# Patient Record
Sex: FEMALE | Race: WHITE | URBAN - METROPOLITAN AREA
[De-identification: names, ages, dates, MRNs, and addresses within clinical notes are randomized per-mention and may not be internally consistent; named-entity substitution may affect disease eponyms.]

---

## 2017-08-06 ENCOUNTER — HISTORICAL (OUTPATIENT)
Dept: ADMINISTRATIVE | Facility: HOSPITAL | Age: 22
End: 2017-08-06

## 2021-08-24 ENCOUNTER — HOSPITAL ENCOUNTER (OUTPATIENT)
Dept: MEDSURG UNIT | Facility: HOSPITAL | Age: 26
End: 2021-08-26
Attending: HOSPITALIST | Admitting: HOSPITALIST

## 2021-08-24 LAB
ABS NEUT (OLG): 2.88 X10(3)/MCL (ref 2.1–9.2)
ALBUMIN SERPL-MCNC: 4.1 GM/DL (ref 3.5–5)
ALBUMIN/GLOB SERPL: 1.6 RATIO (ref 1.1–2)
ALP SERPL-CCNC: 51 UNIT/L (ref 40–150)
ALT SERPL-CCNC: 25 UNIT/L (ref 0–55)
APPEARANCE, UA: CLEAR
APTT PPP: 36.1 SECOND(S) (ref 23.2–33.7)
AST SERPL-CCNC: 31 UNIT/L (ref 5–34)
B-HCG SERPL QL: NEGATIVE
BACTERIA SPEC CULT: ABNORMAL /HPF
BASOPHILS # BLD AUTO: 0 X10(3)/MCL (ref 0–0.2)
BASOPHILS NFR BLD AUTO: 0 %
BILIRUB SERPL-MCNC: 0.3 MG/DL
BILIRUB UR QL STRIP: ABNORMAL
BILIRUBIN DIRECT+TOT PNL SERPL-MCNC: 0.1 MG/DL (ref 0–0.8)
BILIRUBIN DIRECT+TOT PNL SERPL-MCNC: 0.2 MG/DL (ref 0–0.5)
BUN SERPL-MCNC: 10.5 MG/DL (ref 7–18.7)
CALCIUM SERPL-MCNC: 8.5 MG/DL (ref 8.4–10.2)
CHLORIDE SERPL-SCNC: 105 MMOL/L (ref 98–107)
CO2 SERPL-SCNC: 23 MMOL/L (ref 22–29)
COLOR STL: ABNORMAL
COLOR UR: YELLOW
CONSISTENCY STL: ABNORMAL
CREAT SERPL-MCNC: 0.73 MG/DL (ref 0.55–1.02)
CRP SERPL-MCNC: 1.32 MG/DL
D DIMER PPP IA.FEU-MCNC: 0.6 MCG/ML FEU (ref 0–0.5)
EOSINOPHIL # BLD AUTO: 0 X10(3)/MCL (ref 0–0.9)
EOSINOPHIL NFR BLD AUTO: 0 %
ERYTHROCYTE [DISTWIDTH] IN BLOOD BY AUTOMATED COUNT: 12 % (ref 11.5–17)
ERYTHROCYTE [DISTWIDTH] IN BLOOD BY AUTOMATED COUNT: 12 % (ref 11.5–17)
ERYTHROCYTE [SEDIMENTATION RATE] IN BLOOD: 21 MM/HR (ref 0–20)
FERRITIN SERPL-MCNC: 250.98 NG/ML (ref 4.63–204)
FOLATE SERPL-MCNC: 13.3 NG/ML (ref 7–31.4)
GLOBULIN SER-MCNC: 2.6 GM/DL (ref 2.4–3.5)
GLUCOSE (UA): ABNORMAL
GLUCOSE SERPL-MCNC: 114 MG/DL (ref 74–100)
GROUP & RH: NORMAL
HCT VFR BLD AUTO: 36 % (ref 37–47)
HCT VFR BLD AUTO: 37.2 % (ref 37–47)
HCT VFR BLD AUTO: 40.3 % (ref 37–47)
HCT VFR BLD AUTO: 40.9 % (ref 37–47)
HEMOCCULT SP1 STL QL: POSITIVE
HGB BLD-MCNC: 11.9 GM/DL (ref 12–16)
HGB BLD-MCNC: 12.1 GM/DL (ref 12–16)
HGB BLD-MCNC: 13.3 GM/DL (ref 12–16)
HGB BLD-MCNC: 14 GM/DL (ref 12–16)
HGB UR QL STRIP: ABNORMAL
INR PPP: 1 (ref 0–1.3)
IRON SATN MFR SERPL: 9 % (ref 20–50)
IRON SERPL-MCNC: 16 UG/DL (ref 50–170)
KETONES UR QL STRIP: ABNORMAL
LDH SERPL-CCNC: 332 UNIT/L (ref 140–271)
LEUKOCYTE ESTERASE UR QL STRIP: NEGATIVE
LYMPHOCYTES # BLD AUTO: 1.2 X10(3)/MCL (ref 0.6–4.6)
LYMPHOCYTES NFR BLD AUTO: 28 %
MCH RBC QN AUTO: 30.4 PG (ref 27–31)
MCH RBC QN AUTO: 30.6 PG (ref 27–31)
MCHC RBC AUTO-ENTMCNC: 33.1 GM/DL (ref 33–36)
MCHC RBC AUTO-ENTMCNC: 34.2 GM/DL (ref 33–36)
MCV RBC AUTO: 89.5 FL (ref 80–94)
MCV RBC AUTO: 91.8 FL (ref 80–94)
MONOCYTES # BLD AUTO: 0.2 X10(3)/MCL (ref 0.1–1.3)
MONOCYTES NFR BLD AUTO: 5 %
NEUTROPHILS # BLD AUTO: 2.88 X10(3)/MCL (ref 2.1–9.2)
NEUTROPHILS NFR BLD AUTO: 66 %
NITRITE UR QL STRIP: NEGATIVE
PH UR STRIP: 6.5 [PH] (ref 5–9)
PLATELET # BLD AUTO: 146 X10(3)/MCL (ref 130–400)
PLATELET # BLD AUTO: 153 X10(3)/MCL (ref 130–400)
PMV BLD AUTO: 10.2 FL (ref 9.4–12.4)
PMV BLD AUTO: 9.7 FL (ref 9.4–12.4)
POTASSIUM SERPL-SCNC: 4 MMOL/L (ref 3.5–5.1)
PROT SERPL-MCNC: 6.7 GM/DL (ref 6.4–8.3)
PROT UR QL STRIP: ABNORMAL
PROTHROMBIN TIME: 12.6 SECOND(S) (ref 12.5–14.5)
RBC # BLD AUTO: 3.92 X10(6)/MCL (ref 4.2–5.4)
RBC # BLD AUTO: 4.57 X10(6)/MCL (ref 4.2–5.4)
RBC #/AREA URNS HPF: ABNORMAL /HPF
SARS-COV-2 AG RESP QL IA.RAPID: POSITIVE
SODIUM SERPL-SCNC: 139 MMOL/L (ref 136–145)
SP GR UR STRIP: 1.02 (ref 1–1.03)
SQUAMOUS EPITHELIAL, UA: ABNORMAL /HPF
TIBC SERPL-MCNC: 164 UG/DL (ref 70–310)
TIBC SERPL-MCNC: 180 UG/DL (ref 250–450)
TRANSFERRIN SERPL-MCNC: 165 MG/DL (ref 180–382)
UA WBC MAN: ABNORMAL /HPF
UROBILINOGEN UR STRIP-ACNC: 1
VIT B12 SERPL-MCNC: 966 PG/ML (ref 213–816)
WBC # SPEC AUTO: 4 X10(3)/MCL (ref 4.5–11.5)
WBC # SPEC AUTO: 4.4 X10(3)/MCL (ref 4.5–11.5)

## 2021-08-25 LAB
ABS NEUT (OLG): 1.67 X10(3)/MCL (ref 2.1–9.2)
ALBUMIN SERPL-MCNC: 3.2 GM/DL (ref 3.5–5)
ALBUMIN/GLOB SERPL: 1.5 RATIO (ref 1.1–2)
ALP SERPL-CCNC: 43 UNIT/L (ref 40–150)
ALT SERPL-CCNC: 20 UNIT/L (ref 0–55)
AST SERPL-CCNC: 21 UNIT/L (ref 5–34)
BILIRUB SERPL-MCNC: 0.3 MG/DL
BILIRUBIN DIRECT+TOT PNL SERPL-MCNC: 0.1 MG/DL (ref 0–0.5)
BILIRUBIN DIRECT+TOT PNL SERPL-MCNC: 0.2 MG/DL (ref 0–0.8)
BUN SERPL-MCNC: 5.8 MG/DL (ref 7–18.7)
CALCIUM SERPL-MCNC: 7.9 MG/DL (ref 8.4–10.2)
CHLORIDE SERPL-SCNC: 109 MMOL/L (ref 98–107)
CO2 SERPL-SCNC: 25 MMOL/L (ref 22–29)
CREAT SERPL-MCNC: 0.61 MG/DL (ref 0.55–1.02)
EOSINOPHIL # BLD AUTO: 0 X10(3)/MCL (ref 0–0.9)
EOSINOPHIL NFR BLD AUTO: 0 %
ERYTHROCYTE [DISTWIDTH] IN BLOOD BY AUTOMATED COUNT: 12.1 % (ref 11.5–17)
GLOBULIN SER-MCNC: 2.2 GM/DL (ref 2.4–3.5)
GLUCOSE SERPL-MCNC: 92 MG/DL (ref 74–100)
HCT VFR BLD AUTO: 35.4 % (ref 37–47)
HCT VFR BLD AUTO: 36.4 % (ref 37–47)
HCT VFR BLD AUTO: 37.5 % (ref 37–47)
HCT VFR BLD AUTO: 40.7 % (ref 37–47)
HGB BLD-MCNC: 11.6 GM/DL (ref 12–16)
HGB BLD-MCNC: 11.9 GM/DL (ref 12–16)
HGB BLD-MCNC: 12 GM/DL (ref 12–16)
HGB BLD-MCNC: 13 GM/DL (ref 12–16)
LYMPHOCYTES # BLD AUTO: 1.3 X10(3)/MCL (ref 0.6–4.6)
LYMPHOCYTES NFR BLD AUTO: 40 %
MCH RBC QN AUTO: 30.2 PG (ref 27–31)
MCHC RBC AUTO-ENTMCNC: 32 GM/DL (ref 33–36)
MCV RBC AUTO: 94.5 FL (ref 80–94)
MONOCYTES # BLD AUTO: 0.2 X10(3)/MCL (ref 0.1–1.3)
MONOCYTES NFR BLD AUTO: 7 %
NEUTROPHILS # BLD AUTO: 1.67 X10(3)/MCL (ref 2.1–9.2)
NEUTROPHILS NFR BLD AUTO: 52 %
PLATELET # BLD AUTO: 156 X10(3)/MCL (ref 130–400)
PMV BLD AUTO: 9.5 FL (ref 9.4–12.4)
POTASSIUM SERPL-SCNC: 3.7 MMOL/L (ref 3.5–5.1)
PROT SERPL-MCNC: 5.4 GM/DL (ref 6.4–8.3)
RBC # BLD AUTO: 3.97 X10(6)/MCL (ref 4.2–5.4)
SODIUM SERPL-SCNC: 141 MMOL/L (ref 136–145)
WBC # SPEC AUTO: 3.2 X10(3)/MCL (ref 4.5–11.5)

## 2021-08-26 LAB
FINAL CULTURE: NO GROWTH
HCT VFR BLD AUTO: 37.1 % (ref 37–47)
HGB BLD-MCNC: 12 GM/DL (ref 12–16)

## 2022-04-29 NOTE — ED PROVIDER NOTES
Patient:   Kiara Turner            MRN: [id number]            FIN: [id number]               Age:   26 years     Sex:  Female     :  1995   Associated Diagnoses:   Near syncope; Peptic ulcer symptoms; Acute GI bleeding; COVID-19   Author:   Clay TORRES, Feliciano CHATTERJEE      Basic Information   Time seen: Date & time 2021 09:55:00.   History source: Patient.   Arrival mode: Private vehicle.   History limitation: None.      History of Present Illness   The patient presents with rectal bleeding, dark stools, 25 y/o female who presents with blood in stool this AM and felt like she was going to pass out. hx peptic ulcer, stopped taking the medication because one of the medications was making her drowsy. also covid19+. dhaval medina and   I, Dr. Williamson, assumed care of this patient at 1205.    25 y/o female presents to the ED complaining of a near-syncopal episode after dry-heaving onset this morning PTA. She reports bright red and somewhat dark blood in her stool over the last 2 days. She also reports some rectal pain with BMs, but denies any rectal pain today. She has a history of peptic ulcers, but denies any history of an EGD. States taking omeprazole for a while but is not taking it now. Pt currently has COVID-19. She reports ocasional SOB and a nonproductive cough, but denies any CP. She is not taking any medications for this..  The onset was just prior to arrival.  The course/duration of symptoms is episodic: with a few episodes and fluctuating in intensity.  Vomiting: none.  Rectal bleed: blood with stool and degree moderate.  The exacerbating factor is none.  The relieving factor is none.  Risk factors consist of none.  Prior episodes: occasional.  Therapy today: none.  Associated symptoms: rectal pain, cough and shortness of breath.        Review of Systems   Constitutional symptoms:  No fever, no chills   Skin symptoms:  No rash, no abrasions   Respiratory symptoms:  Shortness of breath  (occasional), cough (nonproductive)   Cardiovascular symptoms:  No chest pain, no palpitations, no syncope   Gastrointestinal symptoms:  Nausea, vomiting, Blood in stool, No abdominal pain,    Genitourinary symptoms:  No hematuria,    Musculoskeletal symptoms:  No Muscle pain   Neurologic symptoms:  Near-syncope, no headache, no dizziness, no altered level of consciousness.              Additional review of systems information: All other systems reviewed and otherwise negative.      Health Status   Allergies:    Allergic Reactions (Selected)  No Known Allergies,    Allergies (1) Active Reaction  No Known Allergies None Documented  .   Medications:  (Selected)   Prescriptions  Prescribed  omeprazole 40 mg oral DR capsule: 40 mg = 1 cap(s), Oral, Daily, take in the morning 30 min before you eat or drink anything, # 30 cap(s), 2 Refill(s), Pharmacy: [facility][id number], 175.3, cm, Height/Length Dosing, 08/16/21 18:01:00 CDT, 85.1, kg, Weight Dosing, 08/16/21 18:0...  Documented Medications  Documented  Protonix 40 mg ORAL enteric coated tablet: 40 mg = 1 tab(s), Oral, Once, 0 Refill(s)  Zofran 2 mg/mL injectable solution: 4 mg = 2 mL, IV Push, Once, PRN PRN nausea, 0 Refill(s).      Past Medical/ Family/ Social History   Medical history:    No active or resolved past medical history items have been selected or recorded..   Surgical history:    Tonsillectomy planned (SNOMED CT [id number]) in 1997 at 2 Years..   Family history:    Hypertension.  Mother  .   Social history: Alcohol use: Denies, Tobacco use: Denies, Drug use: Denies.      Physical Examination               Vital Signs   Vital Signs   8/24/2021 12:00 CDT      Peripheral Pulse Rate     86 bpm                             Respiratory Rate          21 br/min                             SpO2                      92 %  LOW                             Oxygen Therapy            Room air                             Systolic Blood  Pressure   109 mmHg                             Diastolic Blood Pressure  72 mmHg                             Mean Arterial Pressure, Cuff              84 mmHg    8/24/2021 11:00 CDT      Peripheral Pulse Rate     85 bpm                             Heart Rate Monitored      88 bpm                             Respiratory Rate          20 br/min                             SpO2                      98 %                             Systolic Blood Pressure   98 mmHg                             Diastolic Blood Pressure  60 mmHg                             Mean Arterial Pressure, Cuff              73 mmHg    8/24/2021 10:29 CDT      Peripheral Pulse Rate     93 bpm                             Heart Rate Monitored      94 bpm                             Respiratory Rate          16 br/min                             SpO2                      97 %                             Oxygen Therapy            Room air                             Systolic Blood Pressure   111 mmHg                             Diastolic Blood Pressure  63 mmHg                             Mean Arterial Pressure, Cuff              79 mmHg    8/24/2021 9:54 CDT       Temperature Oral          36.9 DegC                             Temperature Oral (calculated)             98.42 DegF                             Peripheral Pulse Rate     97 bpm                             Respiratory Rate          18 br/min                             SpO2                      96 %                             Oxygen Therapy            Room air                             Systolic Blood Pressure   98 mmHg                             Diastolic Blood Pressure  70 mmHg  .   Measurements   8/24/2021 9:54 CDT       Weight Dosing             81.5 kg                             Weight Measured and Calculated in Lbs     179.67 lb                             Weight Estimated          81.5 kg                             Height/Length Dosing      175.26 cm                              Height/Length Estimated   175.26 cm                             Body Mass Index Estimated 26.53 kg/m2  .   Basic Oxygen Information   2021 12:00 CDT      SpO2                      92 %  LOW                             Oxygen Therapy            Room air    2021 11:00 CDT      SpO2                      98 %    2021 10:29 CDT      SpO2                      97 %                             Oxygen Therapy            Room air    2021 9:54 CDT       SpO2                      96 %                             Oxygen Therapy            Room air  .   General:  Alert, no acute distress   Neurological:  Alert and oriented to person, place, time, and situation, No focal neurological deficit observed, normal speech observed   Skin:  Warm, dry   Head:  Normocephalic, atraumatic   Eye:     Cardiovascular:  Regular rate and rhythm, Normal peripheral perfusion, No edema   Respiratory:  Lungs are clear to auscultation, respirations are non-labored, breath sounds are equal, Symmetrical chest wall expansion.    Musculoskeletal:  No deformity   Gastrointestinal:  Soft, Nontender, Non distended, Normal bowel sounds, Guarding: Negative, Rebound: Negative, Rectal exam: Hemorrhoid appreciated at 12 o'clock position. No blood or stool in rectal vault. .    Psychiatric:  Cooperative, appropriate mood & affect      Medical Decision Making   Rationale:  Occasional mild shortness of breath with her COVID-19 infection CT angiogram ordered to rule out PE was negative.  No active bleeding on rectal exam.  2 hour H&H is dropped raising concern of bleeding ulcer.  She is already gotten 80 Protonix.  Discussed case with GI patient will be admitted plan for scope in the near future.   Documents reviewed:  Emergency department nurses' notes.   Orders  Launch Order Profile (Selected)   Inpatient Orders  Ordered  HT Screenin21 9:52:11 CDT  Normal Saline Infusion 1,000 mL: 1,000 mL, 1,000 mL, IV, 1,000 mL/hr, start date 21  10:00:00 CDT, 1.97, m2  Patient Isolation: 08/24/21 9:58:57 CDT, Contact Precautions, Constant Indicator  Patient Isolation: 08/24/21 9:58:57 CDT, Droplet Precautions, Constant Indicator  Ordered (Collected)  Urine Culture [id number]: Stat collect, 08/24/21 10:00:00 CDT, Urine, Collected, Nurse collect, [id number], Stop date 08/24/21 10:00:00 CDT, Print Label By Order Location  Canceled  ABO/Rh Retype: Now collect, 08/24/21 10:22:13 CDT, Blood, Stop date 08/24/21 11:37:00 CDT, Lab Collect  Completed  ABO/Rh Retype: Now collect, 08/24/21 10:22:13 CDT, Blood, Stop date 08/24/21 10:22:00 CDT, Lab Collect, 1, Print Label By Order Location  Automated Diff: NOW collect, 08/24/21 10:22:00 CDT, Blood, Collected, Stop date 08/24/21 10:22:00 CDT, Lab Collect, Print Label By Order Location, 08/24/21 9:59:00 CDT  CBC - NO DIFF: STAT collect, 08/24/21 12:20:43 CDT, BLOOD, Collected, Stop date 08/24/21 12:20:00 CDT, Lab Collect  CBC w/ Auto Diff: NOW collect, 08/24/21 10:22:13 CDT, BLOOD, Collected, Stop date 08/24/21 10:22:00 CDT, Lab Collect  CMP: STAT collect, 08/24/21 10:22:13 CDT, BLOOD, Collected, Stop date 08/24/21 10:22:00 CDT, Lab Collect  CT Angio Chest PE W Contrast: Stat, 08/24/21 12:12:00 CDT, Pulmonary Embolism, None, Stretcher, Creatinine if needed per protocol, Rad Type, Schedule this test, 08/24/21 12:12:00 CDT  Estimated Glomerular Filtration Rate: STAT collect, 08/24/21 10:22:00 CDT, Blood, Collected, Stop date 08/24/21 10:22:00 CDT, Lab Collect, Print Label By Order Location, 08/24/21 9:59:00 CDT  PT: STAT collect, 08/24/21 10:22:13 CDT, BLOOD, Collected, Stop date 08/24/21 10:22:00 CDT, Lab Collect  PTT: STAT collect, 08/24/21 10:22:13 CDT, BLOOD, Collected, Stop date 08/24/21 10:22:00 CDT, Lab Collect  Pregnancy Test Urine: Stat collect, Urine, 08/24/21 10:00:00 CDT, Stop date 08/24/21 10:00:00 CDT, Nurse collect, Print Label By Order Location, 08/24/21 10:00:00 CDT  Protonix 40 mg Vial (IV Push): 40 mg,  form: Injection, IV Slow, Once, Infuse over: 2 minute(s), first dose 08/24/21 12:13:00 CDT, stop date 08/24/21 12:13:00 CDT, STAT  Protonix: 80 mg, form: Injection, IV Slow, Once-NOW, first dose 08/24/21 9:59:00 CDT, stop date 08/24/21 9:59:00 CDT, STAT  Type and Ab Screen: 08/24/21 9:59:00 CDT, Stat collect, Blood, Lab Collect, Packed RBC, To Keep Ahead, 1, 08/24/21, Print Label By Order Location, 08/24/21 9:59:00 CDT  Urinalysis with Reflex: Stat collect, Urine, 08/24/21 10:00:00 CDT, Stop date 08/24/21 10:00:00 CDT, Nurse collect, Print Label By Order Location  iopamidol: form: Soln, IV, AdHoc, first dose 08/24/21 12:49:33 CDT, stop date 08/24/21 12:49:33 CDT.   Results review:  Lab results : Lab View   8/24/2021 12:20 CDT      WBC                       4.0 x10(3)/mcL  LOW                             RBC                       3.92 x10(6)/mcL  LOW                             Hgb                       11.9 gm/dL  LOW                             Hct                       36.0 %  LOW                             Platelet                  146 x10(3)/mcL                             MCV                       91.8 fL                             MCH                       30.4 pg                             MCHC                      33.1 gm/dL                             RDW                       12.0 %                             MPV                       10.2 fL    8/24/2021 11:06 CDT      Est Creat Clearance Ser   119.91 mL/min    8/24/2021 10:22 CDT      Sodium Lvl                139 mmol/L                             Potassium Lvl             4.0 mmol/L                             Chloride                  105 mmol/L                             CO2                       23 mmol/L                             Calcium Lvl               8.5 mg/dL                             Glucose Lvl               114 mg/dL  HI                             BUN                       10.5 mg/dL                             Creatinine                 0.73 mg/dL                             eGFR-AA                   >60  NA                             eGFR-NURYS                  >60 mL/min/1.73 m2  NA                             Bili Total                0.3 mg/dL                             Bili Direct               0.2 mg/dL                             Bili Indirect             0.10 mg/dL                             AST                       31 unit/L                             ALT                       25 unit/L                             Alk Phos                  51 unit/L                             Total Protein             6.7 gm/dL                             Albumin Lvl               4.1 gm/dL                             Globulin                  2.6 gm/dL                             A/G Ratio                 1.6 ratio                             PT                        12.6 second(s)                             INR                       1.0                             PTT                       36.1 second(s)  HI                             WBC                       4.4 x10(3)/mcL  LOW                             RBC                       4.57 x10(6)/mcL                             Hgb                       14.0 gm/dL                             Hct                       40.9 %                             Platelet                  153 x10(3)/mcL                             MCV                       89.5 fL                             MCH                       30.6 pg                             MCHC                      34.2 gm/dL                             RDW                       12.0 %                             MPV                       9.7 fL                             Abs Neut                  2.88 x10(3)/mcL                             Neutro Auto               66 %  NA                             Lymph Auto                28 %  NA                             Mono Auto                 5 %  NA                             Eos Auto                   0 %  NA                             Abs Eos                   0.0 x10(3)/mcL                             Basophil Auto             0 %  NA                             Abs Neutro                2.88 x10(3)/mcL                             Abs Lymph                 1.2 x10(3)/mcL                             Abs Mono                  0.2 x10(3)/mcL                             Abs Baso                  0.0 x10(3)/mcL                             ABO/Rh                    O POS                             Antibody Screen           Neg    8/24/2021 10:00 CDT      U Beta hCG Ql             Negative                             UA Appear                 Clear                             UA Color                  Yellow                             UA Spec Grav              1.025                             UA Bili                   2+                             UA pH                     6.5                             UA Urobilinogen           1.0                             UA Blood                  3+                             UA Glucose                Trace                             UA Ketones                Trace                             UA Protein                2+                             UA Nitrite                Negative                             UA Leuk Est               Negative                             UA WBC Man                1-2 /HPF                             UA RBC                    5-10 /HPF                             UA Bacteria               Few /HPF                             UA Squam Epithelial       10-20 /HPF  .   Radiology results:  Reviewed radiologist's report, Rad Results (ST)  < 12 hrs   Accession: [id number]  Order: CT Angio Chest PE W Contrast  Report Dt/Tm: 08/24/2021 12:57  Report:   EXAMINATION  CT Angio Chest PE W Contrast     TECHNIQUE       Helical-acquisition CT images were obtained following the  intravenous administration of iodine-based contrast media.  Scanning  was timed to pulmonary arterial system for purposes of pulmonary CTA.       Multiplanar reconstructions, including MIP images, were  accomplished by a CT technologist at a separate workstation and pushed  to PACS for physician review.     Total DLP (mGy-cm): 323        INDICATION  Pulmonary Embolism     Comparison: None     FINDINGS     Lines and tubes: None.     Lower neck: Unremarkable.     Heart and Mediastinum: The heart is normal in size. There is no  pericardial effusion. The main pulmonary trunk is normal in caliber.  There is no pulmonary embolus identified.     Pleura: No pleural effusion or pneumothorax.     Lymph Nodes: No lymphadenopathy.     Lungs: There are scattered bilateral groundglass airspace opacities,  with more consolidative opacities in the lung bases, left greater than  right.     Trachea: Patent.     Upper abdomen: No acute findings     Bones and soft tissues: No acute findings.     IMPRESSION  1.  No pulmonary embolism identified.  2.  Bilateral groundglass and consolidative airspace opacities  concerning for infection, including COVID 19.    .       Procedure   Critical care note   Total time: 30 minutes spent engaged in work directly related to patient care and/ or available for direct patient care.   Critical condition(s) addressed for impending deterioration include: cardiovascular.   Associated risk factors: bleeding.   Performed by: self.      Impression and Plan   Diagnosis   Near syncope (TNJ89-JI R55)   COVID-19 (LLO86-VN U07.1)   Peptic ulcer symptoms (XON77-MA R19.8)   Acute GI bleeding (CWQ89-FV K92.2)      Calls-Consults   -  8/24/2021 13:55:00 , Chris TORRES , Joseph SOUTH, Gastroenterology, consult, recommends admit to IM; will plan for EGD in the morning.    -  8/24/2021 14:11:00 , Hospital Medicine, consult.    Plan   Condition: Guarded.    Disposition: Admit.    Counseled: Patient, Regarding diagnosis, Regarding diagnostic results, Regarding treatment plan, Patient  indicated understanding of instructions.    Notes: I, Dalia Rubi, acted solely as a scribe for and in the presence of Dr. Williamson who performed the service., I, Dr Williamson have read note from scribe and I agree with history and physical except as amended by me.  All information was dictated from my history and my examination of patient..

## 2022-04-29 NOTE — DISCHARGE SUMMARY
Patient:   Kiara Turner            MRN: [id number]            FIN: [id number]               Age:   26 years     Sex:  Female     :  1995   Associated Diagnoses:   None   Author:   Lobo Ayoub MD      Discharge Information      Discharge Summary Information   Admit/Discharge Dates   Admit Date: 2021  Discharge Date: 2021     Physicians   Attending Physician - Breana TORRES, Gatito FARAH  Admitting Physician - Breana TORRES, Gatito FARAH  Consulting Physician - Lobo Ayoub MD     Discharge Diagnosis   Anemia, mild, prob from menstrual blood loss.  Mild acute blood loss from menstrual bleeding   COVID-19 infection with no hypoxia or requirement of oxygen supplementation  History of peptic ulcer disease  History of GERD     Procedures   No procedures recorded for this visit.   Immunizations   1995 - haemophilus b conjugate (HbOC) vaccine   1993 - poliovirus vaccine, live, trivalent   1993 - haemophilus b conjugate (HbOC) vaccine   1993 - poliovirus vaccine, live, trivalent   1994 - haemophilus b conjugate (HbOC) vaccine   1994 - measles/mumps/rubella virus vaccine   1994 - poliovirus vaccine, live, trivalent   1995 - haemophilus b conjugate (HbOC) vaccine   1997 - measles/mumps/rubella virus vaccine   1997 - poliovirus vaccine, live, trivalent   2006 - meningococcal conjugate vaccine   2006 - tetanus/diphtheria/pertussis, acel(Tdap)   11/15/2008 - human papillomavirus vaccine   11/15/2008 - influenza virus vaccine, inactivated   2011 - human papillomavirus vaccine   2011 - influenza virus vaccine, live   06/15/2011 - human papillomavirus vaccine      Discharge Medications   Prescribed  pantoprazole (Pantoprazole 40 mg ORAL EC-Tablet) 40 mg, Oral, BID  sucralfate (Carafate 1 g oral tablet) 1 gm, Oral, AC & HS  Discontinue  omeprazole (omeprazole 40 mg oral DR capsule) 40 mg, Oral, Daily  ondansetron (Zofran  2 mg/mL injectable solution) 4 mg, IV Push, Once, PRN nausea  pantoprazole (Protonix 40 mg ORAL enteric coated tablet) 40 mg, Oral, Once        Education   Gastrointestinal Bleeding, Easy-to-Read  Near-Syncope, Easy-to-Read  Understanding Coronavirus Disease 2019 (COVID-19) Patient Education-updated 7_20_21 (Custom)  Discharge - 08/26/21 8:16:00 CDT, Home, Give all scheduled vaccinations prior to discharge.   Discharge Activity - Activity as Tolerated   Discharge Diet - Regular         Followup   Joseph Perez MD   Office will call w/follow up appointment        Hospital Course   Hospital Course   Kiara Turner is a 26-year-old woman with a history of PUD and GERD who presents to the ED with complaints of one episode of a mixed black and bright red stool with associated mild epigastric pain that occurred this morning which concerned her so she came to the hospital for further evaluation. She was previously diagnosed with peptic ulcer disease and started on PPI, but has not had an EGD or colonoscopy in the past. She does not take any NSAIDs or blood thinners at this time. She has been tolerating p.o. intake, but has been nauseous making it more difficult. She just started her menstrual period this morning and is unsure if the blood in her stool was from that. She does not follow-up with a gastroenterologist and denies GI bleeds in the past. She also been having a dry cough and intermittent fever over the past x2 weeks, but denies chest pain, shortness of breath, vomiting, diarrhea, or dysuria. She has not received her COVID-19 vaccinations. She denies recent travel. Her vital signs are currently stable. She is afebrile and satting at 96% on room air. Her labs showed an Hgb 11.9 which has dropped from labs drawn at our prior that showed an Hgb of 14.0. Her urinalysis showed 3+ blood and 5-10 RBCs. CTA chest showed no pulmonary embolism identified. Bilateral ground-glass and consolidative airspace opacities  concerning for infection, including COVID 19. ED physician reported a hemorrhoid at 12 o'clock position on the rectal exam, but no blood or stool was in rectal vault. Her covid test came back positive. Patient denied any SOB, cough, fever or chills. Her H&H was stable. Seen by GI team and recommeneded PO PPI and carafate. No in-patient EGD recommended. Patient was stable and asymptomatic. She was discharged to home in a stable condition. Covid vaccination offered and patient refused.   Explained in detail to patient about the discharge plan, medications and F/U visits. She understands and agree with the treatment plan.   Condition stable  Diet: Regular   Meds per dc med rec  Activities as tolerated   F/U with PCP in 1 to 2 weeks  For further questions contact hospitalist office  DC 31 mts   .        Physical Examination   General:  Alert and oriented, No acute distress, Pt is COVID positive, so did a focused/limited physical exam .    Respiratory:  Respirations are non-labored, Symmetrical chest wall expansion.    Neurologic:  No focal deficits, Cranial Nerves II-XII are grossly intact.       Discharge Plan   Education and Follow-up   Counseled: patient, regarding diagnosis, regarding treatment, regarding medications.

## 2022-04-29 NOTE — CONSULTS
Patient:   Kiara Turner            MRN: [id number]            FIN: [id number]               Age:   26 years     Sex:  Female     :  1995   Associated Diagnoses:   None   Author:   Sue Ludwig PA-C      Basic Information   Source of history:  Self, Medical record.    Present at bedside:  Medical personnel.    History limitation:  None.       Chief Complaint   We have been consulted by Dr. Puentes for anemia/melena.      History of Present Illness    This is a 27 YO female, unknown to our group, with no significant past medical history. Presented to the ED 21 with complaints of abdominal pain, melena, and near syncope. On arrival, vital signs stable. Labs revealed minimal hyperglycemia 114 otherwise normal CMP, INR 1, and normal CBC without evidence of anemia. Repeat CBC revealed a mild normocytic anemia with hemoglobin 11.9g/dL. Tested positive for COVID-19 yesterday at an outpatient facility. Admitted and GI consulted for anemia/melena.    Reports acute on chronic, severe, epigastric abdominal pain accompanied by nausea, vomiting, unintentional weight loss and anorexia. Onset approximately two weeks ago. Pain is intermittent and stabbing in quality. Admits to nocturnal episodes. Severity 10/10 at its worst, 0/10 at its best. Exacerbated postprandially, usually following ingestion of fried or highly seasoned foods. Alleviated partially by improving her diet to eliminate dietary triggers. Admits to somewhat improvements s/p initiation of Omeprazole recently. Endorses 5-10lb unintentional weight loss in the past 2 weeks. Admits to anorexia. Nausea is intermittent and usually accompanies the abdominal pain. Denies vomiting. Admits to dry heaving. Reports intermittent reflux, pyrosis, and dyspepsia improved in the past 2 weeks. Admits to a recent change in bowel habits described as diarrhea for the past two weeks, mixed between semi-solid and liquid. Reports 3-4 BMs/day.     Reports an  "episode of severe abdominal pain this morning, followed by dry heaving, cold/clammy skin, and presyncope. Experienced one episode of black stool accompanied by bright red upon wiping and in the toilet water. Denies further episodes of hematochezia/rectal bleeding/melena. Denies improvements in abdominal pain s/p defecation. Admits that she may have started her period this morning. Denies history of GI bleeding. Reports history of "PUD" diagnosed on physical examination by her PCP. Experienced an episode 3 years ago exacerbated by eating fried chicken and admits this pain feels quite similar. Uses Aleve occasionally for menstruation; denies use recently in the past 3 weeks. Denies use of tobacco, etoh, or illicit substances. Endoscopy naive.     Evaluated by [facility] ED 8/16/21. Given prescriptions for Omeprazole and Metoclopramide. Stopped Metaclopramide secondary to weakness and fatigue. Admits symptoms are somewhat improved since starting Omeprazole. Abdominal x-ray at that time revealed moderate stool burden.    Previous records reviewed. Collateral information obtained from nurse.      Review of Systems   Constitutional:  Weakness, Fatigue, No fever, No chills.    Eye:  No icterus.    Ear/Nose/Mouth/Throat:  No sore throat.    Respiratory:  No shortness of breath, No cough.    Cardiovascular:  No chest pain, No palpitations.    Gastrointestinal:  Nausea, Vomiting, Diarrhea, Melena, Hematochezia, No constipation, No heartburn, No hematemesis.         Abdominal pain: Epigastric area, The pain is severe, Characterized as ( Sharp ).    Genitourinary:  No dysuria, No hematuria.    Musculoskeletal:  No joint pain, No muscle pain.    Integumentary:  No jaundice, No rash.    Neurologic:  No confusion, No headache.    Psychiatric:  Not suicidal, Not delusional, No hallucinations.       Health Status   Allergies:    Allergies (1) Active Reaction  No Known Allergies None Documented     Current medications:  (Selected) "   Inpatient Medications  Ordered  Normal Saline Infusion 1,000 mL: 1,000 mL, 1,000 mL, IV, 1,000 mL/hr, start date 08/24/21 10:00:00 CDT, 1.97, m2  Prescriptions  Prescribed  omeprazole 40 mg oral DR capsule: 40 mg = 1 cap(s), Oral, Daily, take in the morning 30 min before you eat or drink anything, # 30 cap(s), 2 Refill(s), Pharmacy: [id number], 175.3, cm, Height/Length Dosing, 08/16/21 18:01:00 CDT, 85.1, kg, Weight Dosing, 08/16/21 18:0...  Documented Medications  Documented  Protonix 40 mg ORAL enteric coated tablet: 40 mg = 1 tab(s), Oral, Once, 0 Refill(s)  Zofran 2 mg/mL injectable solution: 4 mg = 2 mL, IV Push, Once, PRN PRN nausea, 0 Refill(s),    Medications (1) Active  Scheduled: (0)  Continuous: (1)  sodium chloride 0.9% 1,000 mL  1,000 mL, IV, 1,000 mL/hr  PRN: (0)     Problem list:    No problem items selected or recorded.      Histories   Past Medical History:    No active or resolved past medical history items have been selected or recorded.   Family History:    Hypertension.  Mother     Procedure history:    Tonsillectomy planned ([id number]) in 1997 at 2 Years.   Social History        Social & Psychosocial Habits    Alcohol  10/01/2014 Risk Assessment: Denies Alcohol Use    08/06/2017  Use: Current    Frequency: 1-2 times per year    Substance Use  10/01/2014 Risk Assessment: Denies Substance Abuse    08/16/2021  Use: Never    Tobacco  10/01/2014 Risk Assessment: Denies Tobacco Use    08/06/2017  Use: Never smoker  .        Physical Examination      Vital Signs (last 24 hrs)_____  Last Charted___________  Temp Oral     36.9 DegC  (AUG 24 09:54)  Heart Rate Peripheral   75 bpm  (AUG 24 13:00)  Resp Rate         23 br/min  (AUG 24 13:00)  SBP      91 mmHg  (AUG 24 13:00)  DBP      60 mmHg  (AUG 24 13:00)  SpO2      95 %  (AUG 24 13:00)     Not performed secondary to COVID isolation precautions.       Review / Management   Results review   Laboratory Results    Today's Lab Results : PowerNote Discrete Results   8/24/2021 12:20 CDT      WBC                       4.0 x10(3)/mcL  LOW                             RBC                       3.92 x10(6)/mcL  LOW                             Hgb                       11.9 gm/dL  LOW                             Hct                       36.0 %  LOW                             Platelet                  146 x10(3)/mcL                             MCV                       91.8 fL                             MCH                       30.4 pg                             MCHC                      33.1 gm/dL                             RDW                       12.0 %                             MPV                       10.2 fL    8/24/2021 11:06 CDT      Est Creat Clearance Ser   119.91 mL/min    8/24/2021 10:22 CDT      WBC                       4.4 x10(3)/mcL  LOW                             RBC                       4.57 x10(6)/mcL                             Hgb                       14.0 gm/dL                             Hct                       40.9 %                             Platelet                  153 x10(3)/mcL                             MCV                       89.5 fL                             MCH                       30.6 pg                             MCHC                      34.2 gm/dL                             RDW                       12.0 %                             MPV                       9.7 fL                             Abs Neut                  2.88 x10(3)/mcL                             Neutro Auto               66 %  NA                             Lymph Auto                28 %  NA                             Mono Auto                 5 %  NA                             Eos Auto                  0 %  NA                             Abs Eos                   0.0 x10(3)/mcL                             Basophil Auto             0 %  NA                             Abs Neutro                2.88  x10(3)/mcL                             Abs Lymph                 1.2 x10(3)/mcL                             Abs Mono                  0.2 x10(3)/mcL                             Abs Baso                  0.0 x10(3)/mcL                             PT                        12.6 second(s)                             INR                       1.0                             PTT                       36.1 second(s)  HI                             Sodium Lvl                139 mmol/L                             Potassium Lvl             4.0 mmol/L                             Chloride                  105 mmol/L                             CO2                       23 mmol/L                             Calcium Lvl               8.5 mg/dL                             Glucose Lvl               114 mg/dL  HI                             BUN                       10.5 mg/dL                             Creatinine                0.73 mg/dL                             eGFR-AA                   >60  NA                             eGFR-NURYS                  >60 mL/min/1.73 m2  NA                             Bili Total                0.3 mg/dL                             Bili Direct               0.2 mg/dL                             Bili Indirect             0.10 mg/dL                             AST                       31 unit/L                             ALT                       25 unit/L                             Alk Phos                  51 unit/L                             Total Protein             6.7 gm/dL                             Albumin Lvl               4.1 gm/dL                             Globulin                  2.6 gm/dL                             A/G Ratio                 1.6 ratio                             ABO/Rh                    O POS                             Antibody Screen           Neg        Radiology results   Imaging personally reviewed by myself and SP., Rad Results (ST)   Accession:  [id number]  Order: CT Angio Chest PE W Contrast  Report Dt/Tm: 08/24/2021 12:57  Report:   EXAMINATION  CT Angio Chest PE W Contrast     TECHNIQUE       Helical-acquisition CT images were obtained following the  intravenous administration of iodine-based contrast media. Scanning  was timed to pulmonary arterial system for purposes of pulmonary CTA.       Multiplanar reconstructions, including MIP images, were  accomplished by a CT technologist at a separate workstation and pushed  to PACS for physician review.     Total DLP (mGy-cm): 323        INDICATION  Pulmonary Embolism     Comparison: None     FINDINGS     Lines and tubes: None.     Lower neck: Unremarkable.     Heart and Mediastinum: The heart is normal in size. There is no  pericardial effusion. The main pulmonary trunk is normal in caliber.  There is no pulmonary embolus identified.     Pleura: No pleural effusion or pneumothorax.     Lymph Nodes: No lymphadenopathy.     Lungs: There are scattered bilateral groundglass airspace opacities,  with more consolidative opacities in the lung bases, left greater than  right.     Trachea: Patent.     Upper abdomen: No acute findings     Bones and soft tissues: No acute findings.     IMPRESSION  1.  No pulmonary embolism identified.  2.  Bilateral groundglass and consolidative airspace opacities  concerning for infection, including COVID 19.          Impression and Plan   25 YO female, unknown to our group, with no significant past medical history. Presented to the ED 8/24/21 with complaints of abdominal pain, melena, and near syncope. Drop in hemoglobin evident on repeat labs. GI consulted for anemia/melena.    1. Questionable melena vs hematochezia vs rectal bleeding vs menstruation  2. Mild normocytic anemia  Hgb 14 --> 11.9g/dL  3. Epigastric abdominal pain  4. Nausea and vomiting  - Monitor H&H q 8hrs; transfuse as necessary/appropriate  - Monitor stools for s/s overt GI bleeding  - PPI gtt  - Carafate QID  -  Okay for fulls today  - NPO after midnight  - Abdominal US    Hospital policy currently dictates +COVID-19 cases requiring endoscopy must be performed on Fridays in OR8, unless otherwise emergent. Currently, no evidence of overt GI bleeding. Will monitor H&H and reassess in the morning. Timing for EGD to be determined based on clinical course.      Professional Services   Thank you for this consultation and for allowing us to participate in the care of this patient.

## 2022-04-30 VITALS
SYSTOLIC BLOOD PRESSURE: 113 MMHG | BODY MASS INDEX: 28.09 KG/M2 | DIASTOLIC BLOOD PRESSURE: 75 MMHG | OXYGEN SATURATION: 98 % | WEIGHT: 179 LBS | HEIGHT: 67 IN

## 2022-05-02 NOTE — HISTORICAL [facility] CERNER
This is a historical note converted from Vicky. Formatting and pictures may have been removed.  Please reference Vicky for original formatting and attached multimedia. Chief Complaint  c/o a pulling discomfort in chest/upper abd (especially bad when yawns or takes deep breaths) experienced Thursday, and started back last night, pt states she has no other sx  History of Present Illness  22-year-old female with no past medical history presents for epigastric pain and pain with deep inspiration. ?She states it initially started on Thursday and then resolved over the weekend.? She then started noticing the symptoms began this morning.? She reports having a subjective fever on Thursday but did not have a fever again over the weekend.? She has no other symptoms or complaints. ?Does not take any medications and has not taken any over-the-counter medications over the weekend.? She reports that pain occurs with every breath?but is worse when taking a deep breath and is located in her epigastrium.? She still has a gallbladder.? She does have a history of chronic constipation but her last bowel movement was this morning and normal.? Denies nausea, vomiting, diarrhea, cough, wheezing, shortness of breath,?melena, dysuria, hematuria.? She also suffers with heartburn and is not on any medications to control this. ?Denies chest pain and palpitations.  Review of Systems  General: denies f/c, weight loss, night sweats, decreased appetite  Eye: denies blurred vision, changes in vision, vision loss_  Respiratory: denies sob, wheezing, orthopnea, cough  Cardiovascular: denies chest pain, palpitations, edema  Gastrointestinal: +?epigastric pain, burning. ?Denies?melena, hematochezia, constipation, diarrhea  Integumentary: denies rashes, pruritis  Physical Exam  Vitals & Measurements  T:?36.9? ?C ?(Oral)? HR:?72?(Peripheral)? BP:?113/75? SpO2:?98%?  HT:?170.1?cm? HT:?170.1?cm? WT:?81.2?kg? WT:?81.2?kg? BMI:?28.06?  Constitutional: NAD,  alert, pleasant  Respiratory: CTAB, no wheezes, rales or rhonci. No accessory muscle use  Eyes: EOMI  Cardiovascular: RRR, No m/r/g. No JVD. No LE edema  Gastrointestinal: BS+, nondistended. + tenderness of epigastrium, no rebound or guarding. Neg murphys  Integumentary: warm, dry, intact  Psych: AA&Ox3  All Other ROS: negative  Assessment/Plan  1.?PUD (peptic ulcer disease)  ?- discussed GERD diet  - will start PPI  - no eating after 6 pm and stay upright for one hour after meals  Orders:  XR Chest 2 Views, Routine, 08/06/17 8:58:00 CDT, Dyspnea, None, Ambulatory, Rad Type, Epigastric pain, Not Scheduled, 08/06/17 8:58:00 CDT   Problem List/Past Medical History  No chronic problems  Historical  No historical problems  Procedure/Surgical History  Tonsillectomy planned (1997).  Medications  No active medications  Allergies  No Known Allergies  Social History  Alcohol  Current, 1-2 times per year  Tobacco  Never smoker  Family History  Hypertension.: Mother.

## 2022-05-02 NOTE — HISTORICAL [facility]ERNER
This is a historical note converted from Vicky. Formatting and pictures may have been removed.  Please reference Vicky for original formatting and attached multimedia. Chief Complaint  Blood in her stool  History of Present Illness  Kiara Turner is a 26-year-old woman with a history of PUD?and GERD who?presents to the ED with complaints of?one episode?of a mixed?black?and bright red stool?with associated mild epigastric pain?that occurred this morning?which concerned her so she came to the hospital for further evaluation.?She was previously diagnosed with peptic ulcer disease?and started on?PPI,?but has not?had?an EGD or colonoscopy in the past. ?She does not take any NSAIDs?or blood thinners at this time.??She has been tolerating?p.o. intake, but?has been nauseous?making it more difficult. ?She just started her menstrual period this morning?and is unsure?if the blood?in her stool?was from that.??She does not follow-up with a gastroenterologist?and denies GI bleeds in the past. She?also been having a dry cough?and intermittent fever over the past x2 weeks, but denies chest pain,?shortness of breath,?vomiting, diarrhea, or dysuria.? She has not received her COVID-19 vaccinations.? She denies recent travel. Her vital signs are currently stable. ?She is afebrile and satting at 96% on room air. ?Her labs showed an Hgb 11.9 which has dropped from labs drawn at our prior that showed an Hgb of 14.0. ?Her urinalysis showed 3+ blood and 5-10 RBCs. CTA chest showed no pulmonary embolism identified. Bilateral ground-glass and consolidative airspace opacities concerning for infection, including COVID 19.? ED physician?reported a hemorrhoid at 12 oclock position on the rectal exam, but?no blood or stool was in rectal vault.  Review of Systems  As per HPI otherwise all systems reviewed and negative.  Physical Exam  Vitals & Measurements  T:?36.9? ?C (Oral)? HR:?87(Peripheral)? HR:?87(Monitored)? RR:?21? BP:?91/60?  SpO2:?97%? WT:?81.5?kg?  General:?well-developed well-nourished in no acute distress  Eye: PERRLA, EOMI, clear conjunctiva, eyelids normal  HENT:? oropharynx without erythema/exudate, oropharynx and nasal mucosal surfaces moist  Neck: full range of motion, no lymphadenopathy  Respiratory:?clear to auscultation bilaterally  Cardiovascular:?regular rate and rhythm without murmurs, gallops or rubs  Gastrointestinal:?soft, non-tender, non-distended with normal bowel sounds, without masses to palpation  Genitourinary: no CVA tenderness to palpation  Musculoskeletal:?full range of motion of all extremities/spine without limitation or discomfort  Integumentary: warm, dry  Neurologic: cranial nerves intact, motor/sensory function intact  Assessment/Plan  Anemia secondary to possible?GI bleed  Currently on menstrual period  COVID-19?pneumonia/pneumonitis?with no hypoxia or requirement of?oxygen supplementation  History of peptic ulcer disease  History of GERD  ?  Plan:  -H&H is currently stable?so no need for blood transfusion at this time,?will transfuse if Hgb drops below 7.0  -Iron panel, TIBC, ferritin, B12, folate-pending  -Protonix 40 mg?IV twice daily  -Consult GI for further evaluation of GI bleed  -Clear liquid diet for now, NPO after midnight?for possible scope  -Hold oral anticoagulation for now due to possible GI bleed  -Drop in Hgb?may be from a GI bleed?versus?starting her menstrual period today or a combination of both  -In the presence of COVID-19 infection?with pneumonia/pneumonitis seen on CXR we?will trend inflammatory markers,?but since she has not hypoxic?and not requiring?oxygen supplementation?there is no indication for steroids or remdesivir at this time  -Zinc and vitamin D  -repeat labs in a.m.,?continue to?trend H&H  ?  DVT prophylaxis: SCDs  Code Status: Full  PCP: None  ?  I, Wagner Juarez PA-C, discussed this case with Dr. Toussaint  Please see addendum for further assessment and plan per MD.  ?    For this patient encounter, I reviewed the NP/PA/resident documentation, treatment plan, and medical decision making; and I had face-to-face time with this patient.  Assumed patient care at? 1530?on 8/24/21.  ?  History: ?Reviewed HPI, medical, surgical, family and social history as above  ?  Physical: Agree with documentation above  ?  Treatment Plan:?  Patient with intermittent abdominal pain attributed to GERD.? Seen in ED last week and starte don protonix without relief  Now with dizziness and significant drop in Hgb.? Noted guiac positive.? Also on period  Hgb not low enough for transfusion  Vital stable  Incidentally tested positive for COVID yesterday.? Afebrile and no respiratory issues/complaints  Maintain standard isolation precautions.? No treatment at this time.  ?   As clarification, on 8/24/21__ patient should be admitted for hospital observation services under my care?   Problem List/Past Medical History  PUD, GERD  Procedure/Surgical History  Tonsillectomy planned (1997)   Medications  Inpatient  Carafate, 1 gm= 1 tab(s), Oral, AC & HS  ergocalciferol 50,000 intUnit oral capsule, 22065 units= 1 cap(s), Oral, q7day  Normal Saline Infusion 1,000 mL, 1000 mL, IV  Protonix, 40 mg= 1 EA, IV Slow, BID  Sodium Chloride 0.9% intravenous solution 100 mL + Pantoprazole additive for cont. infusion 40 mg  zinc sulfate 220 mg oral capsule, 220 mg= 1 cap(s), Oral, Daily  Zofran, 4 mg= 2 mL, IV Push, q4hr, PRN  Home  omeprazole 40 mg oral DR capsule, 40 mg= 1 cap(s), Oral, Daily, 2 refills,? ?Not taking  Protonix 40 mg ORAL enteric coated tablet, 40 mg= 1 tab(s), Oral, Once,? ?Not Taking per Prescriber  Zofran 2 mg/mL injectable solution, 4 mg= 2 mL, IV Push, Once, PRN,? ?Not taking  Allergies  No Known Allergies  Social History  Alcohol - Denies Alcohol Use, 10/01/2014  Current, 1-2 times per year, 08/06/2017  Substance Use - Denies Substance Abuse, 10/01/2014  Never, 08/16/2021  Tobacco - Denies Tobacco Use,  10/01/2014  Never smoker, 08/06/2017  Family History  Hypertension.: Mother.  Lab Results  Labs Last 24 Hours?  ?Chemistry? Hematology/Coagulation?   Sodium Lvl: 139 mmol/L (08/24/21 10:22:00) PT: 12.6 second(s) (08/24/21 10:22:00)   Potassium Lvl: 4 mmol/L (08/24/21 10:22:00) INR: 1 (08/24/21 10:22:00)   Chloride: 105 mmol/L (08/24/21 10:22:00) PTT:?36.1 second(s)?High (08/24/21 10:22:00)   CO2: 23 mmol/L (08/24/21 10:22:00) WBC:?4 x10(3)/mcL?Low (08/24/21 12:20:00)   Calcium Lvl: 8.5 mg/dL (08/24/21 10:22:00) RBC:?3.92 x10(6)/mcL?Low (08/24/21 12:20:00)   Glucose Lvl:?114 mg/dL?High (08/24/21 10:22:00) Hgb:?11.9 gm/dL?Low (08/24/21 12:20:00)   BUN: 10.5 mg/dL (08/24/21 10:22:00) Hct:?36 %?Low (08/24/21 12:20:00)   Creatinine: 0.73 mg/dL (08/24/21 10:22:00) Platelet: 146 x10(3)/mcL (08/24/21 12:20:00)   Est Creat Clearance Ser: 119.91 mL/min (08/24/21 11:06:29) MCV: 91.8 fL (08/24/21 12:20:00)   eGFR-AA: >60 (08/24/21 10:22:00) MCH: 30.4 pg (08/24/21 12:20:00)   eGFR-NURYS: >60 (08/24/21 10:22:00) MCHC: 33.1 gm/dL (08/24/21 12:20:00)   Bili Total: 0.3 mg/dL (08/24/21 10:22:00) RDW: 12 % (08/24/21 12:20:00)   Bili Direct: 0.2 mg/dL (08/24/21 10:22:00) MPV: 10.2 fL (08/24/21 12:20:00)   Bili Indirect: 0.1 mg/dL (08/24/21 10:22:00) Abs Neut: 2.88 x10(3)/mcL (08/24/21 10:22:00)   AST: 31 unit/L (08/24/21 10:22:00) Neutro Auto: 66 % (08/24/21 10:22:00)   ALT: 25 unit/L (08/24/21 10:22:00) Lymph Auto: 28 % (08/24/21 10:22:00)   Alk Phos: 51 unit/L (08/24/21 10:22:00) Mono Auto: 5 % (08/24/21 10:22:00)   Total Protein: 6.7 gm/dL (08/24/21 10:22:00) Eos Auto: 0 % (08/24/21 10:22:00)   Albumin Lvl: 4.1 gm/dL (08/24/21 10:22:00) Abs Eos: 0 x10(3)/mcL (08/24/21 10:22:00)   Globulin: 2.6 gm/dL (08/24/21 10:22:00) Basophil Auto: 0 % (08/24/21 10:22:00)   A/G Ratio: 1.6 ratio (08/24/21 10:22:00) Abs Neutro: 2.88 x10(3)/mcL (08/24/21 10:22:00)   U Beta hCG Ql: Negative (08/24/21 10:00:00) Abs Lymph: 1.2 x10(3)/mcL (08/24/21  10:22:00)    Abs Mono: 0.2 x10(3)/mcL (08/24/21 10:22:00)    Abs Baso: 0 x10(3)/mcL (08/24/21 10:22:00)   Diagnostic Results  Reason For Exam  Pulmonary Embolism  ?  Radiology Report  EXAMINATION  CT Angio Chest PE W Contrast  ?  TECHNIQUE  ?? ? Helical-acquisition CT images were obtained following the  intravenous administration of iodine-based contrast media. Scanning  was timed to pulmonary arterial system for purposes of pulmonary CTA.  ?? ? Multiplanar reconstructions, including MIP images, were  accomplished by a CT technologist at a separate workstation and pushed  to PACS for physician review.  ?  Total DLP (mGy-cm): 323  ?  ?  INDICATION  Pulmonary Embolism  ?  Comparison: None  ?  FINDINGS  ?  Lines and tubes: None.  ?  Lower neck: Unremarkable.  ?  Heart and Mediastinum: The heart is normal in size. There is no  pericardial effusion. The main pulmonary trunk is normal in caliber.  There is no pulmonary embolus identified.  ?  Pleura: No pleural effusion or pneumothorax.  ?  Lymph Nodes: No lymphadenopathy.  ?  Lungs: There are scattered bilateral groundglass airspace opacities,  with more consolidative opacities in the lung bases, left greater than  right.  ?  Trachea: Patent.  ?  Upper abdomen: No acute findings  ?  Bones and soft tissues: No acute findings.  ?  IMPRESSION  1. ?No pulmonary embolism identified.  2. ?Bilateral groundglass and consolidative airspace opacities  concerning for infection, including COVID 19.  ?  Signature Line  Electronically Signed By: Kacie Ba MD  Date/Time Signed: 08/24/2021 13:01  ?